# Patient Record
Sex: MALE | Race: WHITE | Employment: FULL TIME | URBAN - METROPOLITAN AREA
[De-identification: names, ages, dates, MRNs, and addresses within clinical notes are randomized per-mention and may not be internally consistent; named-entity substitution may affect disease eponyms.]

---

## 2019-04-12 ENCOUNTER — APPOINTMENT (OUTPATIENT)
Dept: ULTRASOUND IMAGING | Age: 44
End: 2019-04-12
Payer: COMMERCIAL

## 2019-04-12 ENCOUNTER — HOSPITAL ENCOUNTER (EMERGENCY)
Age: 44
Discharge: HOME | End: 2019-04-13
Attending: EMERGENCY MEDICINE
Payer: COMMERCIAL

## 2019-04-12 VITALS
DIASTOLIC BLOOD PRESSURE: 78 MMHG | SYSTOLIC BLOOD PRESSURE: 115 MMHG | OXYGEN SATURATION: 97 % | TEMPERATURE: 97.8 F | HEART RATE: 72 BPM | BODY MASS INDEX: 25.76 KG/M2 | WEIGHT: 184 LBS | HEIGHT: 71 IN | RESPIRATION RATE: 16 BRPM

## 2019-04-12 DIAGNOSIS — K40.91 UNILATERAL RECURRENT INGUINAL HERNIA WITHOUT OBSTRUCTION OR GANGRENE: ICD-10-CM

## 2019-04-12 DIAGNOSIS — N50.811 TESTICULAR PAIN, RIGHT: Primary | ICD-10-CM

## 2019-04-12 PROCEDURE — 99283 EMERGENCY DEPT VISIT LOW MDM: CPT

## 2019-04-12 PROCEDURE — 93975 VASCULAR STUDY: CPT

## 2019-04-12 PROCEDURE — 76870 US EXAM SCROTUM: CPT

## 2019-04-12 RX ORDER — SULFAMETHOXAZOLE AND TRIMETHOPRIM 800; 160 MG/1; MG/1
1 TABLET ORAL 2 TIMES DAILY
COMMUNITY
End: 2019-04-16

## 2019-04-12 ASSESSMENT — PAIN DESCRIPTION - DESCRIPTORS: DESCRIPTORS: BURNING

## 2019-04-12 ASSESSMENT — PAIN SCALES - GENERAL: PAINLEVEL_OUTOF10: 5

## 2019-04-12 ASSESSMENT — PAIN DESCRIPTION - LOCATION: LOCATION: SCROTUM

## 2019-04-12 ASSESSMENT — PAIN DESCRIPTION - PAIN TYPE: TYPE: ACUTE PAIN

## 2019-04-13 RX ORDER — KETOROLAC TROMETHAMINE 10 MG/1
10 TABLET, FILM COATED ORAL 3 TIMES DAILY
Qty: 12 TABLET | Refills: 0 | Status: SHIPPED | OUTPATIENT
Start: 2019-04-13

## 2019-04-13 NOTE — ED PROVIDER NOTES
Triage Chief Complaint:    Groin Swelling (patient states that his right groin/testicle started hurting last thursday/Friday. pt. states that it has been getting worse. pt. states that the pain comes and goes but \"is not letting up tonight'. pt states that he thought it was d/t him exercising more but, worried d/t multiple surgeries on testicles. )    [place]:  Jerrell Walsh is a 41 y.o. male that presents to the emergency Department with complaints of having right testicular pain. Patient states he has had problems like this in the past.  Patient states he was concerned because the pain is there and it radiates from his right inguinal area down into the right testicle. The patient states that he has had testing before, and it has always come back negative. Patient is also concerned about possibly of a hernia. Patient states that the pain did not start while he was exerting himself or lifting any heavy objects. ROS:  At least 10 systems reviewed and otherwise acutely negative except as in the [address] History reviewed. No pertinent past medical history. Past Surgical History:   Procedure Laterality Date    TESTICLE SURGERY       History reviewed. No pertinent family history.   Social History     Socioeconomic History    Marital status:      Spouse name: Not on file    Number of children: Not on file    Years of education: Not on file    Highest education level: Not on file   Occupational History    Not on file   Social Needs    Financial resource strain: Not on file    Food insecurity:     Worry: Not on file     Inability: Not on file    Transportation needs:     Medical: Not on file     Non-medical: Not on file   Tobacco Use    Smoking status: Former Smoker    Smokeless tobacco: Never Used   Substance and Sexual Activity    Alcohol use: No    Drug use: No    Sexual activity: Not on file   Lifestyle    Physical activity:     Days per week: Not on file     Minutes per session: Not on file  Stress: Not on file   Relationships    Social connections:     Talks on phone: Not on file     Gets together: Not on file     Attends [employer] service: Not on file     Active member of club or organization: Not on file     Attends meetings of clubs or organizations: Not on file     Relationship status: Not on file    Intimate partner violence:     Fear of current or ex partner: Not on file     Emotionally abused: Not on file     Physically abused: Not on file     Forced sexual activity: Not on file   Other Topics Concern    Not on file   Social History Narrative    Not on file     No current facility-administered medications for this encounter. Current Outpatient Medications   Medication Sig Dispense Refill    ketorolac (TORADOL) 10 MG tablet Take 1 tablet by mouth 3 times daily 12 tablet 0    sulfamethoxazole-trimethoprim (BACTRIM DS;SEPTRA DS) 800-160 MG per tablet Take 1 tablet by mouth 2 times daily      omeprazole (PRILOSEC) 10 MG delayed release capsule Take 10 mg by mouth daily       No Known Allergies    Nursing Notes Reviewed    Physical Exam:  ED Triage Vitals [04/12/19 2149]   BP Temp Temp Source Pulse Resp SpO2 Height Weight   115/78 97.8 °F (36.6 °C) Oral 72 16 97 % 5' 11\" (1.803 m) 184 lb (83.5 kg)     GENERAL APPEARANCE: Awake and alert. Cooperative. No acute distress. HEAD:Normocephalic. Atraumatic. EYES: EOM's grossly intact. Sclera anicteric. ENT: Mucous membranes are moist. Tolerates saliva. No trismus. NECK: Supple. No meningismus. Trachea midline. HEART: RRR. LUNGS: Respirations unlabored. CTAB  ABDOMEN: Soft. Mild tenderness to right low inguinal area, with tenderness in the area of the right direct hernia  EXTREMITIES: No acute deformities. SKIN: Warm and dry. NEUROLOGICAL: No gross facial drooping. Moves all 4 extremities spontaneously. : Right testicle demonstrates no signs of pain or swelling.   Physical Exam    I have reviewed and interpreted all of the currently availablelab results from this visit (if applicable):  No results found for this visit on 04/12/19. Radiographs (if obtained):  [] The following radiograph was interpreted by myself in the absence of a radiologist:  [x] Radiologist's Report Reviewed:  US DUP ABD PEL RETRO SCROT COMPLETE   Final Result   1. Normal bilateral arterial and venous Doppler flow of the testes. 2. 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 US SCROTUM AND TESTICLES   Final Result   1. Normal bilateral arterial and venous Doppler flow of the testes. 2. 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 EKG (if obtained): (All EKG's are interpreted by myself in theabsence of a cardiologist)    Procedures    MDM:  After initial evaluation, the patient's going to require an ultrasound of his testicles to rule out possibility torsion. The patient did have ultrasound done, and shows no signs of any abnormalities. The patient, on exam, did have a signs of tenderness in the right direct hernia area, so I do think that following up with surgeon may be advisable for him. Clinical Impression:  1. Testicular pain, right    2.  Unilateral recurrent inguinal hernia without obstruction or gangrene      (Please note that portions of this note Rk Haynesz been completed with a voice recognition program. Efforts were made to edit the dictations but occasionally words are mis-transcribed.)    MD rAleen Mckeon MD  04/14/19 1034

## 2019-04-13 NOTE — ED NOTES
Report from [facility], [address] [place] [address].       Ascencion Pantoja RN  04/12/19 4976

## 2019-04-16 ENCOUNTER — APPOINTMENT (OUTPATIENT)
Dept: CT IMAGING | Age: 44
End: 2019-04-16
Payer: COMMERCIAL

## 2019-04-16 ENCOUNTER — HOSPITAL ENCOUNTER (EMERGENCY)
Age: 44
Discharge: HOME | End: 2019-04-16
Payer: COMMERCIAL

## 2019-04-16 VITALS
HEART RATE: 77 BPM | SYSTOLIC BLOOD PRESSURE: 125 MMHG | WEIGHT: 184 LBS | OXYGEN SATURATION: 99 % | DIASTOLIC BLOOD PRESSURE: 73 MMHG | TEMPERATURE: 97.7 F | RESPIRATION RATE: 16 BRPM | HEIGHT: 72 IN | BODY MASS INDEX: 24.92 KG/M2

## 2019-04-16 DIAGNOSIS — R10.31 ABDOMINAL PAIN, RIGHT LOWER QUADRANT: ICD-10-CM

## 2019-04-16 DIAGNOSIS — R11.0 NAUSEA: ICD-10-CM

## 2019-04-16 DIAGNOSIS — K40.90 RIGHT INGUINAL HERNIA: Primary | ICD-10-CM

## 2019-04-16 LAB
A/G RATIO: 1.7 (ref 1.1–2.2)
ALBUMIN SERPL-MCNC: 4.7 G/DL (ref 3.4–5)
ALP BLD-CCNC: 101 U/L (ref 40–129)
ALT SERPL-CCNC: 23 U/L (ref 10–40)
ANION GAP SERPL CALCULATED.3IONS-SCNC: 12 MMOL/L (ref 3–16)
AST SERPL-CCNC: 19 U/L (ref 15–37)
BASOPHILS ABSOLUTE: 0 K/UL (ref 0–0.2)
BASOPHILS RELATIVE PERCENT: 0.8 %
BILIRUB SERPL-MCNC: 0.7 MG/DL (ref 0–1)
BILIRUBIN URINE: NEGATIVE
BLOOD, URINE: NEGATIVE
BUN BLDV-MCNC: 15 MG/DL (ref 7–20)
CALCIUM SERPL-MCNC: 9.3 MG/DL (ref 8.3–10.6)
CHLORIDE BLD-SCNC: 100 MMOL/L (ref 99–110)
CLARITY: CLEAR
CO2: 26 MMOL/L (ref 21–32)
COLOR: ABNORMAL
CREAT SERPL-MCNC: 1 MG/DL (ref 0.9–1.3)
EOSINOPHILS ABSOLUTE: 0.1 K/UL (ref 0–0.6)
EOSINOPHILS RELATIVE PERCENT: 1.9 %
GFR AFRICAN AMERICAN: >60
GFR NON-AFRICAN AMERICAN: >60
GLOBULIN: 2.7 G/DL
GLUCOSE BLD-MCNC: 85 MG/DL (ref 70–99)
GLUCOSE URINE: NEGATIVE MG/DL
HCT VFR BLD CALC: 46.8 % (ref 40.5–52.5)
HEMOGLOBIN: 16.2 G/DL (ref 13.5–17.5)
KETONES, URINE: 15 MG/DL
LEUKOCYTE ESTERASE, URINE: NEGATIVE
LYMPHOCYTES ABSOLUTE: 1.4 K/UL (ref 1–5.1)
LYMPHOCYTES RELATIVE PERCENT: 29.6 %
MCH RBC QN AUTO: 31.2 PG (ref 26–34)
MCHC RBC AUTO-ENTMCNC: 34.6 G/DL (ref 31–36)
MCV RBC AUTO: 90.2 FL (ref 80–100)
MICROSCOPIC EXAMINATION: ABNORMAL
MONOCYTES ABSOLUTE: 0.3 K/UL (ref 0–1.3)
MONOCYTES RELATIVE PERCENT: 6.6 %
NEUTROPHILS ABSOLUTE: 2.8 K/UL (ref 1.7–7.7)
NEUTROPHILS RELATIVE PERCENT: 61.1 %
NITRITE, URINE: NEGATIVE
PDW BLD-RTO: 13.4 % (ref 12.4–15.4)
PH UA: 6 (ref 5–8)
PLATELET # BLD: 161 K/UL (ref 135–450)
PMV BLD AUTO: 10 FL (ref 5–10.5)
POTASSIUM SERPL-SCNC: 3.9 MMOL/L (ref 3.5–5.1)
PROTEIN UA: NEGATIVE MG/DL
RBC # BLD: 5.19 M/UL (ref 4.2–5.9)
SODIUM BLD-SCNC: 138 MMOL/L (ref 136–145)
SPECIFIC GRAVITY UA: <=1.005 (ref 1–1.03)
TOTAL PROTEIN: 7.4 G/DL (ref 6.4–8.2)
URINE REFLEX TO CULTURE: ABNORMAL
URINE TYPE: ABNORMAL
UROBILINOGEN, URINE: 0.2 E.U./DL
WBC # BLD: 4.6 K/UL (ref 4–11)

## 2019-04-16 PROCEDURE — 96361 HYDRATE IV INFUSION ADD-ON: CPT

## 2019-04-16 PROCEDURE — 99284 EMERGENCY DEPT VISIT MOD MDM: CPT

## 2019-04-16 PROCEDURE — 6360000004 HC RX CONTRAST MEDICATION: Performed by: NURSE PRACTITIONER

## 2019-04-16 PROCEDURE — 80053 COMPREHEN METABOLIC PANEL: CPT

## 2019-04-16 PROCEDURE — 2580000003 HC RX 258: Performed by: NURSE PRACTITIONER

## 2019-04-16 PROCEDURE — 85025 COMPLETE CBC W/AUTO DIFF WBC: CPT

## 2019-04-16 PROCEDURE — 96374 THER/PROPH/DIAG INJ IV PUSH: CPT

## 2019-04-16 PROCEDURE — 6370000000 HC RX 637 (ALT 250 FOR IP): Performed by: NURSE PRACTITIONER

## 2019-04-16 PROCEDURE — 74177 CT ABD & PELVIS W/CONTRAST: CPT

## 2019-04-16 PROCEDURE — 6360000002 HC RX W HCPCS: Performed by: NURSE PRACTITIONER

## 2019-04-16 PROCEDURE — 81003 URINALYSIS AUTO W/O SCOPE: CPT

## 2019-04-16 RX ORDER — 0.9 % SODIUM CHLORIDE 0.9 %
1000 INTRAVENOUS SOLUTION INTRAVENOUS ONCE
Status: COMPLETED | OUTPATIENT
Start: 2019-04-16 | End: 2019-04-16

## 2019-04-16 RX ORDER — ACETAMINOPHEN 500 MG
1000 TABLET ORAL ONCE
Status: COMPLETED | OUTPATIENT
Start: 2019-04-16 | End: 2019-04-16

## 2019-04-16 RX ORDER — MORPHINE SULFATE 4 MG/ML
4 INJECTION, SOLUTION INTRAMUSCULAR; INTRAVENOUS ONCE
Status: DISCONTINUED | OUTPATIENT
Start: 2019-04-16 | End: 2019-04-16 | Stop reason: HOSPADM

## 2019-04-16 RX ORDER — ACETAMINOPHEN AND CODEINE PHOSPHATE 300; 30 MG/1; MG/1
1 TABLET ORAL 3 TIMES DAILY PRN
Qty: 10 TABLET | Refills: 0 | Status: SHIPPED | OUTPATIENT
Start: 2019-04-16 | End: 2019-04-19

## 2019-04-16 RX ORDER — IBUPROFEN 600 MG/1
600 TABLET ORAL ONCE
Status: COMPLETED | OUTPATIENT
Start: 2019-04-16 | End: 2019-04-16

## 2019-04-16 RX ORDER — ONDANSETRON 4 MG/1
4 TABLET, ORALLY DISINTEGRATING ORAL EVERY 8 HOURS PRN
Qty: 20 TABLET | Refills: 0 | Status: SHIPPED | OUTPATIENT
Start: 2019-04-16

## 2019-04-16 RX ORDER — ONDANSETRON 2 MG/ML
4 INJECTION INTRAMUSCULAR; INTRAVENOUS ONCE
Status: COMPLETED | OUTPATIENT
Start: 2019-04-16 | End: 2019-04-16

## 2019-04-16 RX ADMIN — ACETAMINOPHEN 1000 MG: 500 TABLET ORAL at 15:31

## 2019-04-16 RX ADMIN — ONDANSETRON 4 MG: 2 INJECTION INTRAMUSCULAR; INTRAVENOUS at 13:12

## 2019-04-16 RX ADMIN — IBUPROFEN 600 MG: 600 TABLET, FILM COATED ORAL at 15:32

## 2019-04-16 RX ADMIN — SODIUM CHLORIDE 1000 ML: 9 INJECTION, SOLUTION INTRAVENOUS at 13:12

## 2019-04-16 RX ADMIN — IOPAMIDOL 75 ML: 755 INJECTION, SOLUTION INTRAVENOUS at 13:48

## 2019-04-16 ASSESSMENT — ENCOUNTER SYMPTOMS
COLOR CHANGE: 0
NAUSEA: 1
COUGH: 0
WHEEZING: 0
SHORTNESS OF BREATH: 0
ABDOMINAL PAIN: 1
BACK PAIN: 0
VOMITING: 0
DIARRHEA: 0

## 2019-04-16 ASSESSMENT — PAIN SCALES - GENERAL
PAINLEVEL_OUTOF10: 1
PAINLEVEL_OUTOF10: 3
PAINLEVEL_OUTOF10: 1
PAINLEVEL_OUTOF10: 7

## 2019-04-16 ASSESSMENT — PAIN DESCRIPTION - LOCATION: LOCATION: ABDOMEN

## 2019-04-16 ASSESSMENT — PAIN DESCRIPTION - ORIENTATION: ORIENTATION: RIGHT

## 2019-04-16 ASSESSMENT — PAIN DESCRIPTION - PAIN TYPE: TYPE: ACUTE PAIN

## 2019-04-16 NOTE — ED NOTES
Pt scripts x2 instructed to follow up with PCP. Assessed per [facility] STEPHEN.      Radha Ibrahim LPN  21/24/07 8875

## 2019-04-16 NOTE — ED PROVIDER NOTES
**[facility]  ED  eMERGENCY dEPARTMENT eNCOUnter      Pt Name: Bryce Person  RJS:[id number]  [place] 1975  Date of evaluation: 4/16/2019  Provider: LIBERTY Schumacher CNP      Chief Complaint:    Chief Complaint   Patient presents with    Abdominal Pain     dx with hernia on Friday. told to f/u with Dr Kalyn Junior, and has an appt scheduled for this thursday. pt reports not getting script filled due to tylenol and ibuprofen working up until today. (pt reports having an [address] on Friday to verify blood flow)        Nursing Notes, Past Medical Hx, Past Surgical Hx, Social Hx, Allergies, and Family Hx were all reviewed and agreed with or any disagreements were addressed in the HPI.    HPI:  (Location, Duration, Timing, Severity,Quality, Assoc Sx, Context, Modifying factors)  This is a  41 y.o. male presents emergency Department with right lower quadrant abdominal pain associated with nausea no vomiting or diarrhea, does report that he's had a 40 pound weight loss in the past 11 weeks as he is on a specific diet, Optiva. He was seen in the emergency department at [facility], until he had a hernia. He has an appointment scheduled with general surgery for this Thursday, states that he did not get his prescription for Ultram filled because he was doing okay with the pain medicine using Tylenol and Motrin. He rates the pain as 7 out of 10, states the pain has increasingly worsened. He reports nausea but no vomiting or diarrhea. No fever or chill. No cough, congestion or urinary symptoms. He does report that he has become more active, denies any known straining to cause hernia. He denies any additional complaints, no additional aggravating or relieving factors. The patient presents awake, alert and in no acute distress or toxic appearance.       PastMedical/Surgical History:      Diagnosis Date    Kidney stone          Procedure Laterality Date    Musculoskeletal: Normal range of motion. Neurological: He is alert and oriented to person, place, and time. GCS eye subscore is 4. GCS verbal subscore is 5. GCS motor subscore is 6. Skin: Skin is warm. He is not diaphoretic. No pallor. Psychiatric: He has a normal mood and affect. His behavior is normal.   Nursing note and vitals reviewed. MEDICAL DECISION MAKING    Vitals:    Vitals:    04/16/19 1250 04/16/19 1429   BP: 124/86 125/73   Pulse: 73 77   Resp: 16 16   Temp: 97.7 °F (36.5 °C)    TempSrc: Oral    SpO2: 99% 99%   Weight: 184 lb (83.5 kg)    Height: 6' (1.829 m)        LABS:  Labs Reviewed   URINE RT REFLEX TO CULTURE - Abnormal; Notable for the following components:       Result Value    Ketones, Urine 15 (*)     All other components within normal limits    Narrative:     Performed at:  [place] [address] [facility]   Phone [phone]   CBC WITH AUTO DIFFERENTIAL    Narrative:     Performed at:  [place] [address] [facility]   Phone [phone]   COMPREHENSIVE METABOLIC PANEL    Narrative:     Performed at:  [address], [facility]   Phone  of labs reviewed and werenegative at this time or not returned at the time of this note. RADIOLOGY:   Non-plain film images such as CT, Ultrasound and MRI are read by the radiologist. Mayra KINCAID, APRN - CNP have directly visualized the radiologic plain film image(s) with the below findings:        Interpretation per the Radiologist below, if available at the time of thisnote:    CT ABDOMEN PELVIS W IV CONTRAST Additional Contrast? None   Preliminary Result   Shotty lymph nodes in the right lower quadrant without evidence of   appendicitis. Patient has a fat containing right inguinal hernia without   strangulation.   Shotty right inguinal lymph nodes are present. Us Scrotum And Testicles    Result Date: 4/13/2019  EXAMINATION: ULTRASOUND OF THE SCROTUM/TESTICLES WITH COLOR DOPPLER FLOW EVALUATION; DOPPLER EVALUATION OF THE PELVIS 4/12/2019 COMPARISON: Testicular ultrasound 02/09/2011. HISTORY: ORDERING SYSTEM PROVIDED HISTORY: right testicular pain Initial encounter. FINDINGS: Measurements: Right testicle: 4.1 x 2.9 x 2.1 cm Left testicle: 4.2 x 2.7 x 2.1 cm Right: Grey scale:  Tubular ectasia of the rete testis is again seen. No focal abnormality. No evidence of testicular microlithiasis. Doppler Evaluation:  There is normal arterial and venous Doppler flow within the testicle. Scrotal Sac:  No evidence of hydrocele. Epididymis:  No acute abnormality. Left: Grey scale: The left testicle demonstrates normal homogeneous echotexture without focal lesion. No evidence of testicular microlithiasis. Doppler Evaluation:  There is normal arterial and venous Doppler flow within the testicle. Scrotal Sac:  No evidence of hydrocele. Epididymis:  No acute abnormality. 1. Normal bilateral arterial and venous Doppler flow of the testes. 2. 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 Us Dup Abd Pel Retro Scrot Complete    Result Date: 4/13/2019  EXAMINATION: ULTRASOUND OF THE SCROTUM/TESTICLES WITH COLOR DOPPLER FLOW EVALUATION; DOPPLER EVALUATION OF THE PELVIS 4/12/2019 COMPARISON: Testicular ultrasound 02/09/2011. HISTORY: ORDERING SYSTEM PROVIDED HISTORY: right testicular pain Initial encounter. FINDINGS: Measurements: Right testicle: 4.1 x 2.9 x 2.1 cm Left testicle: 4.2 x 2.7 x 2.1 cm Right: Grey scale:  Tubular ectasia of the rete testis is again seen. No focal abnormality. No evidence of testicular microlithiasis. Doppler Evaluation:  There is normal arterial and venous Doppler flow within the testicle. Scrotal Sac:  No evidence of hydrocele. Epididymis:  No acute abnormality. Left: Grey scale:   The left testicle without strangulation along with some shoddy right inguinal lymph nodes present. Upon reevaluation vital signs are stable. Patient refused pain medicine secondary to the patient's driving. I did educate the patient he should keep his appointment with general surgery that is scheduled for this Thursday. However, at this time patient is not requiring any further diagnostic studies. Therefore, shared medical decision was made between the patient myself we agreed he could be discharged home with outpatient follow-up. Patient was discharged home with her sister for Tylenol 3, educated to not take Ultram, and was given Zofran as needed. Educated to return for any worsening symptoms. Educated about straining and using his abdominal muscles to prevent worsening hernia. The patient tolerated their visit well. I evaluated the patient. The physician was available for consultation as needed. The patient and / or the family were informed of the results of anytests, a time was given to answer questions, a plan was proposed and they agreed with plan. Patient verbalized understanding of discharge instructions and was discharged from the department in stable condition. CLINICAL IMPRESSION:  1. Right inguinal hernia    2. Abdominal pain, right lower quadrant    3.  Nausea        DISPOSITION Decision To Discharge 04/16/2019 03:03:37 PM      PATIENT REFERRED TO:  Angelica Lee MD  [address]/[facility]  [phone]    Schedule an appointment as soon as possible for a visit in 2 days  Follow-up with their family doctor next 2 days reevaluation      Keep your appointment with general surgery that is scheduled for Thursday        [facility] \[facility]\"" - [facility] [address] [facility] [address] [facility]  Go to   If symptoms worsen      DISCHARGE MEDICATIONS:  Discharge Medication List as of 4/16/2019  3:05 PM      START taking these medications    Details   ondansetron (ZOFRAN ODT) 4 MG disintegrating tablet Take 1 tablet by mouth every 8 hours as needed for Nausea, Disp-20 tablet, R-0Print      acetaminophen-codeine (TYLENOL/CODEINE #3) 300-30 MG per tablet Take 1 tablet by mouth 3 times daily as needed for Pain for up to 3 days. , Disp-10 tablet, R-0Print             DISCONTINUED MEDICATIONS:  Discharge Medication List as of 4/16/2019  3:05 PM      STOP taking these medications       sulfamethoxazole-trimethoprim (BACTRIM DS;SEPTRA DS) 800-160 MG per tablet Comments:   Reason for Stopping:         omeprazole (PRILOSEC) 10 MG delayed release capsule Comments:   Reason for Stopping:                      (Please note the MDM and HPI sections of this note were completed with a voice recognition program.  Efforts weremade to edit the dictations but occasionally words are mis-transcribed.)    Electronically signed, LIBERTY Rodriguez CNP,         LIBERTY Rodriguez CNP  04/16/19 8972

## 2019-04-18 ENCOUNTER — TELEPHONE (OUTPATIENT)
Dept: SURGERY | Age: 44
End: 2019-04-18

## 2019-04-18 ENCOUNTER — INITIAL CONSULT (OUTPATIENT)
Dept: SURGERY | Age: 44
End: 2019-04-18
Payer: COMMERCIAL

## 2019-04-18 VITALS
DIASTOLIC BLOOD PRESSURE: 70 MMHG | SYSTOLIC BLOOD PRESSURE: 105 MMHG | WEIGHT: 187.2 LBS | HEART RATE: 66 BPM | HEIGHT: 72 IN | BODY MASS INDEX: 25.35 KG/M2

## 2019-04-18 DIAGNOSIS — K40.90 RIGHT INGUINAL HERNIA: Primary | ICD-10-CM

## 2019-04-18 PROCEDURE — 99243 OFF/OP CNSLTJ NEW/EST LOW 30: CPT | Performed by: SURGERY

## 2019-04-18 NOTE — TELEPHONE ENCOUNTER
Advised patient his Robotic RIH Repair is scheduled on Tuesday, April 30, 2019 at [facility]. Arrive at 6:00 a.m., surgery at 7:30 a.m.  NPO after midnight.   Dr. Sierra Mayer to do H&P.

## 2019-04-18 NOTE — LETTER
Abbey Byrnes are scheduled to have surgery with Dr. Coby Damon     AT:  [facility] (Main Entrance)     ON:  Tuesday, April 30, 2019    ARRIVE AT: 6:00 a.m. YOUR SURGERY WILL BEGIN AT: 7:30 a.m. You should have nothing by mouth after midnight the evening before your  surgery,  No food or water. Otherwise, Your Surgery Will Be Cancelled. You will need a History and Physical prior to your surgery by your family doctor. The hospital will contact you if any testing is needed. You will need someone available to drive you home after the surgery and be with   you the rest of the day. Contact our office if you have a Pacemaker or Defibrillator. If you are allergic to   Latex Gloves,  We need to know prior to surgery. If you have any questions, please feel free to contact Katie at [phone].

## 2019-04-19 PROBLEM — K40.90 RIGHT INGUINAL HERNIA: Status: ACTIVE | Noted: 2019-04-19

## 2019-04-19 NOTE — PROGRESS NOTES
and negative. PHYSICAL EXAM:  VITALS:  /70 (Site: Right Wrist, Position: Sitting, Cuff Size: Medium Adult)   Pulse 66   Ht 6' (1.829 m)   Wt 187 lb 3.2 oz (84.9 kg)   BMI 25.39 kg/m²   24HR INTAKE/OUTPUT:    [unfilled]  [unfilled]    CONSTITUTIONAL:  alert, no apparent distress and normal weight  EYES:  PERRL, sclera clear  ENT:  Normocepalic,atraumatic, without obvious abnormality  NECK:  supple, symmetrical, trachea midline  LUNGS: Resp effort easy and unlabored, clear to auscultation  CARDIOVASCULAR:  NO JVD, regular rate and rhythm and no murmur noted  ABDOMEN:  Right inguinal scar, normal bowel sounds, soft, non-distended, non-tender, voluntary guarding absent, no masses palpated and hernia present right inguinal  MUSCULOSKELETAL: No clubbing or cyanosis, 0+ pitting edema lower extremities  NEUROLOGIC:  Mental Status Exam:  Level of Alertness:   awake  PSYCHIATRIC:   person, place, time  SKIN:  no bruising or bleeding    DATA:    CBC:   Recent Labs     04/16/19  1305   WBC 4.6   HGB 16.2   HCT 46.8        BMP:    Recent Labs     04/16/19  1305      K 3.9      CO2 26   BUN 15   CREATININE 1.0   GLUCOSE 85     Hepatic:   Recent Labs     04/16/19  1305   AST 19   ALT 23   BILITOT 0.7   ALKPHOS 101     Mag:    No results for input(s): MG in the last 72 hours. Phos:   No results for input(s): PHOS in the last 72 hours. INR: No results for input(s): INR in the last 72 hours. Radiology Review: Images personally reviewed by me. CT -   Shotty lymph nodes in the right lower quadrant without evidence of   appendicitis.  Patient has a fat containing right inguinal hernia without   strangulation.  Shotty right inguinal lymph nodes are present.         US  1. Normal bilateral arterial and venous Doppler flow of the testes. 2. 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             IMPRESSION/RECOMMENDATIONS:    42 yo with right inguinal hernia  1.

## 2019-04-25 RX ORDER — OMEPRAZOLE 40 MG/1
40 CAPSULE, DELAYED RELEASE ORAL DAILY
COMMUNITY
End: 2019-04-25

## 2019-04-25 RX ORDER — ACETAMINOPHEN AND CODEINE PHOSPHATE 300; 30 MG/1; MG/1
1 TABLET ORAL EVERY 4 HOURS PRN
COMMUNITY

## 2019-04-25 RX ORDER — BUPROPION HYDROCHLORIDE 150 MG/1
150 TABLET ORAL EVERY MORNING
COMMUNITY
End: 2019-04-25

## 2019-04-25 NOTE — PROGRESS NOTES
Obstructive Sleep Apnea (ADRIEL) Screening     Patient:  Terry Jaffe    YOB: 1975      Medical Record #:  [id number]                     Date:  4/25/2019     1. Are you a loud and/or regular snorer? []  Yes       [x] No    2. Have you been observed to gasp or stop breathing during sleep? []  Yes       [x] No    3. Do you feel tired or groggy upon awakening or do you awaken with a headache?           []  Yes       [x] No    4. Are you often tired or fatigued during the wake time hours? []  Yes       [x] No    5. Do you fall asleep sitting, reading, watching TV or driving? []  Yes       [x] No    6. Do you often have problems with memory or concentration? []  Yes       [x] No    **If patient's score is ? 3 they are considered high risk for ADRIEL. Notify the anesthesiologist of the high risk and document in focus note. Note:  If the patient's BMI is more than 35 kg m¯² , has neck circumference > 40 cm, and/or high blood pressure the risk is greater (© American Sleep Apnea Association, 2006).

## 2019-04-30 ENCOUNTER — ANESTHESIA EVENT (OUTPATIENT)
Dept: OPERATING ROOM | Age: 44
End: 2019-04-30
Payer: COMMERCIAL

## 2019-04-30 ENCOUNTER — HOSPITAL ENCOUNTER (OUTPATIENT)
Age: 44
Setting detail: OUTPATIENT SURGERY
Discharge: HOME | End: 2019-04-30
Attending: SURGERY | Admitting: SURGERY
Payer: COMMERCIAL

## 2019-04-30 ENCOUNTER — ANESTHESIA (OUTPATIENT)
Dept: OPERATING ROOM | Age: 44
End: 2019-04-30
Payer: COMMERCIAL

## 2019-04-30 VITALS
WEIGHT: 178 LBS | RESPIRATION RATE: 13 BRPM | HEART RATE: 97 BPM | OXYGEN SATURATION: 95 % | BODY MASS INDEX: 24.11 KG/M2 | TEMPERATURE: 97.2 F | HEIGHT: 72 IN | DIASTOLIC BLOOD PRESSURE: 69 MMHG | SYSTOLIC BLOOD PRESSURE: 119 MMHG

## 2019-04-30 VITALS — TEMPERATURE: 98.4 F | OXYGEN SATURATION: 99 % | DIASTOLIC BLOOD PRESSURE: 72 MMHG | SYSTOLIC BLOOD PRESSURE: 118 MMHG

## 2019-04-30 DIAGNOSIS — Z98.890 S/P RIGHT INGUINAL HERNIORRHAPHY: Primary | ICD-10-CM

## 2019-04-30 DIAGNOSIS — Z87.19 S/P RIGHT INGUINAL HERNIORRHAPHY: Primary | ICD-10-CM

## 2019-04-30 PROCEDURE — 2709999900 HC NON-CHARGEABLE SUPPLY: Performed by: SURGERY

## 2019-04-30 PROCEDURE — 3700000000 HC ANESTHESIA ATTENDED CARE: Performed by: SURGERY

## 2019-04-30 PROCEDURE — 49650 LAP ING HERNIA REPAIR INIT: CPT | Performed by: SURGERY

## 2019-04-30 PROCEDURE — C9290 INJ, BUPIVACAINE LIPOSOME: HCPCS | Performed by: SURGERY

## 2019-04-30 PROCEDURE — 3700000001 HC ADD 15 MINUTES (ANESTHESIA): Performed by: SURGERY

## 2019-04-30 PROCEDURE — 6360000002 HC RX W HCPCS: Performed by: NURSE ANESTHETIST, CERTIFIED REGISTERED

## 2019-04-30 PROCEDURE — 2580000003 HC RX 258: Performed by: ANESTHESIOLOGY

## 2019-04-30 PROCEDURE — 2500000003 HC RX 250 WO HCPCS: Performed by: NURSE ANESTHETIST, CERTIFIED REGISTERED

## 2019-04-30 PROCEDURE — 7100000011 HC PHASE II RECOVERY - ADDTL 15 MIN: Performed by: SURGERY

## 2019-04-30 PROCEDURE — 6360000002 HC RX W HCPCS: Performed by: SURGERY

## 2019-04-30 PROCEDURE — 7100000010 HC PHASE II RECOVERY - FIRST 15 MIN: Performed by: SURGERY

## 2019-04-30 PROCEDURE — 6370000000 HC RX 637 (ALT 250 FOR IP): Performed by: ANESTHESIOLOGY

## 2019-04-30 PROCEDURE — 7100000001 HC PACU RECOVERY - ADDTL 15 MIN: Performed by: SURGERY

## 2019-04-30 PROCEDURE — 3600000019 HC SURGERY ROBOT ADDTL 15MIN: Performed by: SURGERY

## 2019-04-30 PROCEDURE — C1781 MESH (IMPLANTABLE): HCPCS | Performed by: SURGERY

## 2019-04-30 PROCEDURE — 7100000000 HC PACU RECOVERY - FIRST 15 MIN: Performed by: SURGERY

## 2019-04-30 PROCEDURE — 3600000009 HC SURGERY ROBOT BASE: Performed by: SURGERY

## 2019-04-30 PROCEDURE — S2900 ROBOTIC SURGICAL SYSTEM: HCPCS | Performed by: SURGERY

## 2019-04-30 PROCEDURE — 2580000003 HC RX 258: Performed by: SURGERY

## 2019-04-30 PROCEDURE — 6360000002 HC RX W HCPCS: Performed by: ANESTHESIOLOGY

## 2019-04-30 DEVICE — MESH HERN L W10.8XL16CM R INGUINAL WHT POLYPR MFIL: Type: IMPLANTABLE DEVICE | Site: INGUINAL | Status: FUNCTIONAL

## 2019-04-30 RX ORDER — SODIUM CHLORIDE, SODIUM LACTATE, POTASSIUM CHLORIDE, CALCIUM CHLORIDE 600; 310; 30; 20 MG/100ML; MG/100ML; MG/100ML; MG/100ML
INJECTION, SOLUTION INTRAVENOUS CONTINUOUS
Status: DISCONTINUED | OUTPATIENT
Start: 2019-04-30 | End: 2019-04-30 | Stop reason: HOSPADM

## 2019-04-30 RX ORDER — ONDANSETRON 2 MG/ML
4 INJECTION INTRAMUSCULAR; INTRAVENOUS EVERY 10 MIN PRN
Status: DISCONTINUED | OUTPATIENT
Start: 2019-04-30 | End: 2019-04-30 | Stop reason: HOSPADM

## 2019-04-30 RX ORDER — ROCURONIUM BROMIDE 10 MG/ML
INJECTION, SOLUTION INTRAVENOUS PRN
Status: DISCONTINUED | OUTPATIENT
Start: 2019-04-30 | End: 2019-04-30 | Stop reason: SDUPTHER

## 2019-04-30 RX ORDER — BUPIVACAINE HYDROCHLORIDE AND EPINEPHRINE 5; 5 MG/ML; UG/ML
INJECTION, SOLUTION PERINEURAL PRN
Status: DISCONTINUED | OUTPATIENT
Start: 2019-04-30 | End: 2019-04-30 | Stop reason: ALTCHOICE

## 2019-04-30 RX ORDER — OXYCODONE HYDROCHLORIDE AND ACETAMINOPHEN 5; 325 MG/1; MG/1
2 TABLET ORAL PRN
Status: COMPLETED | OUTPATIENT
Start: 2019-04-30 | End: 2019-04-30

## 2019-04-30 RX ORDER — GUAIFENESIN AND DEXTROMETHORPHAN HYDROBROMIDE 100; 10 MG/5ML; MG/5ML
5 SOLUTION ORAL EVERY 8 HOURS PRN
Qty: 120 ML | Refills: 0 | Status: SHIPPED | OUTPATIENT
Start: 2019-04-30

## 2019-04-30 RX ORDER — MEPERIDINE HYDROCHLORIDE 50 MG/ML
12.5 INJECTION INTRAMUSCULAR; INTRAVENOUS; SUBCUTANEOUS EVERY 5 MIN PRN
Status: DISCONTINUED | OUTPATIENT
Start: 2019-04-30 | End: 2019-04-30 | Stop reason: HOSPADM

## 2019-04-30 RX ORDER — ACETAMINOPHEN 500 MG
1000 TABLET ORAL EVERY 6 HOURS PRN
COMMUNITY

## 2019-04-30 RX ORDER — LABETALOL HYDROCHLORIDE 5 MG/ML
5 INJECTION, SOLUTION INTRAVENOUS EVERY 10 MIN PRN
Status: DISCONTINUED | OUTPATIENT
Start: 2019-04-30 | End: 2019-04-30 | Stop reason: HOSPADM

## 2019-04-30 RX ORDER — OXYCODONE HYDROCHLORIDE AND ACETAMINOPHEN 5; 325 MG/1; MG/1
1-2 TABLET ORAL EVERY 6 HOURS PRN
Qty: 20 TABLET | Refills: 0 | Status: SHIPPED | OUTPATIENT
Start: 2019-04-30 | End: 2019-05-05

## 2019-04-30 RX ORDER — PROPOFOL 10 MG/ML
INJECTION, EMULSION INTRAVENOUS PRN
Status: DISCONTINUED | OUTPATIENT
Start: 2019-04-30 | End: 2019-04-30 | Stop reason: SDUPTHER

## 2019-04-30 RX ORDER — HYDROMORPHONE HCL 110MG/55ML
PATIENT CONTROLLED ANALGESIA SYRINGE INTRAVENOUS PRN
Status: DISCONTINUED | OUTPATIENT
Start: 2019-04-30 | End: 2019-04-30 | Stop reason: SDUPTHER

## 2019-04-30 RX ORDER — DEXAMETHASONE SODIUM PHOSPHATE 4 MG/ML
INJECTION, SOLUTION INTRA-ARTICULAR; INTRALESIONAL; INTRAMUSCULAR; INTRAVENOUS; SOFT TISSUE PRN
Status: DISCONTINUED | OUTPATIENT
Start: 2019-04-30 | End: 2019-04-30 | Stop reason: SDUPTHER

## 2019-04-30 RX ORDER — OXYCODONE HYDROCHLORIDE AND ACETAMINOPHEN 5; 325 MG/1; MG/1
1 TABLET ORAL PRN
Status: COMPLETED | OUTPATIENT
Start: 2019-04-30 | End: 2019-04-30

## 2019-04-30 RX ORDER — LIDOCAINE HYDROCHLORIDE 20 MG/ML
INJECTION, SOLUTION INFILTRATION; PERINEURAL PRN
Status: DISCONTINUED | OUTPATIENT
Start: 2019-04-30 | End: 2019-04-30 | Stop reason: SDUPTHER

## 2019-04-30 RX ORDER — FENTANYL CITRATE 50 UG/ML
INJECTION, SOLUTION INTRAMUSCULAR; INTRAVENOUS PRN
Status: DISCONTINUED | OUTPATIENT
Start: 2019-04-30 | End: 2019-04-30 | Stop reason: SDUPTHER

## 2019-04-30 RX ORDER — CETIRIZINE HYDROCHLORIDE 5 MG/1
5 TABLET ORAL DAILY
COMMUNITY

## 2019-04-30 RX ORDER — ONDANSETRON 4 MG/1
4 TABLET, FILM COATED ORAL EVERY 8 HOURS PRN
Qty: 12 TABLET | Refills: 0 | Status: SHIPPED | OUTPATIENT
Start: 2019-04-30

## 2019-04-30 RX ORDER — SODIUM CHLORIDE 0.9 % (FLUSH) 0.9 %
10 SYRINGE (ML) INJECTION PRN
Status: DISCONTINUED | OUTPATIENT
Start: 2019-04-30 | End: 2019-04-30 | Stop reason: HOSPADM

## 2019-04-30 RX ORDER — MIDAZOLAM HYDROCHLORIDE 1 MG/ML
INJECTION INTRAMUSCULAR; INTRAVENOUS PRN
Status: DISCONTINUED | OUTPATIENT
Start: 2019-04-30 | End: 2019-04-30 | Stop reason: SDUPTHER

## 2019-04-30 RX ORDER — LIDOCAINE HYDROCHLORIDE 10 MG/ML
0.3 INJECTION, SOLUTION EPIDURAL; INFILTRATION; INTRACAUDAL; PERINEURAL
Status: DISCONTINUED | OUTPATIENT
Start: 2019-04-30 | End: 2019-04-30 | Stop reason: HOSPADM

## 2019-04-30 RX ORDER — HYDRALAZINE HYDROCHLORIDE 20 MG/ML
5 INJECTION INTRAMUSCULAR; INTRAVENOUS EVERY 10 MIN PRN
Status: DISCONTINUED | OUTPATIENT
Start: 2019-04-30 | End: 2019-04-30 | Stop reason: HOSPADM

## 2019-04-30 RX ORDER — SODIUM CHLORIDE 0.9 % (FLUSH) 0.9 %
10 SYRINGE (ML) INJECTION EVERY 12 HOURS SCHEDULED
Status: DISCONTINUED | OUTPATIENT
Start: 2019-04-30 | End: 2019-04-30 | Stop reason: HOSPADM

## 2019-04-30 RX ORDER — ONDANSETRON 2 MG/ML
INJECTION INTRAMUSCULAR; INTRAVENOUS PRN
Status: DISCONTINUED | OUTPATIENT
Start: 2019-04-30 | End: 2019-04-30 | Stop reason: SDUPTHER

## 2019-04-30 RX ADMIN — HYDROMORPHONE HYDROCHLORIDE 0.5 MG: 2 INJECTION INTRAMUSCULAR; INTRAVENOUS; SUBCUTANEOUS at 09:01

## 2019-04-30 RX ADMIN — ROCURONIUM BROMIDE 10 MG: 10 SOLUTION INTRAVENOUS at 08:58

## 2019-04-30 RX ADMIN — HYDROMORPHONE HYDROCHLORIDE 0.5 MG: 1 INJECTION, SOLUTION INTRAMUSCULAR; INTRAVENOUS; SUBCUTANEOUS at 10:17

## 2019-04-30 RX ADMIN — SUGAMMADEX 200 MG: 100 INJECTION, SOLUTION INTRAVENOUS at 09:47

## 2019-04-30 RX ADMIN — HYDROMORPHONE HYDROCHLORIDE 0.5 MG: 1 INJECTION, SOLUTION INTRAMUSCULAR; INTRAVENOUS; SUBCUTANEOUS at 10:39

## 2019-04-30 RX ADMIN — ONDANSETRON 4 MG: 2 INJECTION INTRAMUSCULAR; INTRAVENOUS at 09:12

## 2019-04-30 RX ADMIN — ONDANSETRON 4 MG: 2 INJECTION INTRAMUSCULAR; INTRAVENOUS at 10:38

## 2019-04-30 RX ADMIN — ONDANSETRON 4 MG: 2 INJECTION INTRAMUSCULAR; INTRAVENOUS at 11:49

## 2019-04-30 RX ADMIN — HYDROMORPHONE HYDROCHLORIDE 0.5 MG: 1 INJECTION, SOLUTION INTRAMUSCULAR; INTRAVENOUS; SUBCUTANEOUS at 10:30

## 2019-04-30 RX ADMIN — SODIUM CHLORIDE, POTASSIUM CHLORIDE, SODIUM LACTATE AND CALCIUM CHLORIDE: 600; 310; 30; 20 INJECTION, SOLUTION INTRAVENOUS at 09:14

## 2019-04-30 RX ADMIN — HYDROMORPHONE HYDROCHLORIDE 0.5 MG: 2 INJECTION INTRAMUSCULAR; INTRAVENOUS; SUBCUTANEOUS at 09:35

## 2019-04-30 RX ADMIN — MIDAZOLAM HYDROCHLORIDE 2 MG: 2 INJECTION, SOLUTION INTRAMUSCULAR; INTRAVENOUS at 07:29

## 2019-04-30 RX ADMIN — PROPOFOL 180 MG: 10 INJECTION, EMULSION INTRAVENOUS at 07:34

## 2019-04-30 RX ADMIN — HYDROMORPHONE HYDROCHLORIDE 0.5 MG: 1 INJECTION, SOLUTION INTRAMUSCULAR; INTRAVENOUS; SUBCUTANEOUS at 11:13

## 2019-04-30 RX ADMIN — FENTANYL CITRATE 100 MCG: 50 INJECTION INTRAMUSCULAR; INTRAVENOUS at 07:33

## 2019-04-30 RX ADMIN — HYDROMORPHONE HYDROCHLORIDE 0.5 MG: 2 INJECTION INTRAMUSCULAR; INTRAVENOUS; SUBCUTANEOUS at 09:22

## 2019-04-30 RX ADMIN — OXYCODONE HYDROCHLORIDE AND ACETAMINOPHEN 2 TABLET: 5; 325 TABLET ORAL at 12:03

## 2019-04-30 RX ADMIN — LIDOCAINE HYDROCHLORIDE 100 MG: 20 INJECTION, SOLUTION INFILTRATION; PERINEURAL at 07:34

## 2019-04-30 RX ADMIN — HYDROMORPHONE HYDROCHLORIDE 0.5 MG: 2 INJECTION INTRAMUSCULAR; INTRAVENOUS; SUBCUTANEOUS at 09:48

## 2019-04-30 RX ADMIN — DEXAMETHASONE SODIUM PHOSPHATE 4 MG: 4 INJECTION, SOLUTION INTRAMUSCULAR; INTRAVENOUS at 09:12

## 2019-04-30 RX ADMIN — FENTANYL CITRATE 50 MCG: 50 INJECTION INTRAMUSCULAR; INTRAVENOUS at 08:13

## 2019-04-30 RX ADMIN — Medication 2 G: at 07:39

## 2019-04-30 RX ADMIN — ROCURONIUM BROMIDE 50 MG: 10 SOLUTION INTRAVENOUS at 07:34

## 2019-04-30 RX ADMIN — FENTANYL CITRATE 50 MCG: 50 INJECTION INTRAMUSCULAR; INTRAVENOUS at 08:30

## 2019-04-30 RX ADMIN — ROCURONIUM BROMIDE 20 MG: 10 SOLUTION INTRAVENOUS at 08:00

## 2019-04-30 RX ADMIN — SODIUM CHLORIDE, POTASSIUM CHLORIDE, SODIUM LACTATE AND CALCIUM CHLORIDE: 600; 310; 30; 20 INJECTION, SOLUTION INTRAVENOUS at 07:29

## 2019-04-30 ASSESSMENT — PULMONARY FUNCTION TESTS
PIF_VALUE: 10
PIF_VALUE: 26
PIF_VALUE: 16
PIF_VALUE: 0
PIF_VALUE: 26
PIF_VALUE: 26
PIF_VALUE: 24
PIF_VALUE: 23
PIF_VALUE: 26
PIF_VALUE: 28
PIF_VALUE: 26
PIF_VALUE: 27
PIF_VALUE: 26
PIF_VALUE: 13
PIF_VALUE: 25
PIF_VALUE: 23
PIF_VALUE: 26
PIF_VALUE: 0
PIF_VALUE: 26
PIF_VALUE: 27
PIF_VALUE: 7
PIF_VALUE: 17
PIF_VALUE: 25
PIF_VALUE: 28
PIF_VALUE: 28
PIF_VALUE: 26
PIF_VALUE: 0
PIF_VALUE: 22
PIF_VALUE: 26
PIF_VALUE: 23
PIF_VALUE: 26
PIF_VALUE: 28
PIF_VALUE: 12
PIF_VALUE: 26
PIF_VALUE: 25
PIF_VALUE: 26
PIF_VALUE: 12
PIF_VALUE: 25
PIF_VALUE: 26
PIF_VALUE: 3
PIF_VALUE: 12
PIF_VALUE: 25
PIF_VALUE: 1
PIF_VALUE: 26
PIF_VALUE: 12
PIF_VALUE: 23
PIF_VALUE: 15
PIF_VALUE: 24
PIF_VALUE: 26
PIF_VALUE: 3
PIF_VALUE: 0
PIF_VALUE: 26
PIF_VALUE: 23
PIF_VALUE: 26
PIF_VALUE: 0
PIF_VALUE: 16
PIF_VALUE: 26
PIF_VALUE: 22
PIF_VALUE: 25
PIF_VALUE: 26
PIF_VALUE: 12
PIF_VALUE: 23
PIF_VALUE: 26
PIF_VALUE: 26
PIF_VALUE: 23
PIF_VALUE: 26
PIF_VALUE: 12
PIF_VALUE: 26
PIF_VALUE: 22
PIF_VALUE: 28
PIF_VALUE: 25
PIF_VALUE: 27
PIF_VALUE: 26
PIF_VALUE: 23
PIF_VALUE: 16
PIF_VALUE: 0
PIF_VALUE: 27
PIF_VALUE: 12
PIF_VALUE: 26
PIF_VALUE: 20
PIF_VALUE: 26
PIF_VALUE: 12
PIF_VALUE: 26
PIF_VALUE: 26
PIF_VALUE: 0
PIF_VALUE: 26
PIF_VALUE: 3
PIF_VALUE: 28
PIF_VALUE: 2
PIF_VALUE: 15
PIF_VALUE: 26
PIF_VALUE: 25
PIF_VALUE: 28
PIF_VALUE: 26
PIF_VALUE: 26
PIF_VALUE: 23
PIF_VALUE: 15
PIF_VALUE: 12
PIF_VALUE: 26
PIF_VALUE: 12
PIF_VALUE: 26
PIF_VALUE: 26
PIF_VALUE: 23
PIF_VALUE: 26
PIF_VALUE: 26
PIF_VALUE: 23
PIF_VALUE: 16
PIF_VALUE: 26
PIF_VALUE: 23
PIF_VALUE: 1
PIF_VALUE: 26
PIF_VALUE: 16
PIF_VALUE: 1
PIF_VALUE: 26
PIF_VALUE: 0
PIF_VALUE: 26
PIF_VALUE: 12
PIF_VALUE: 26
PIF_VALUE: 28
PIF_VALUE: 26
PIF_VALUE: 10
PIF_VALUE: 12
PIF_VALUE: 3
PIF_VALUE: 26
PIF_VALUE: 26
PIF_VALUE: 15
PIF_VALUE: 28
PIF_VALUE: 0
PIF_VALUE: 23
PIF_VALUE: 1
PIF_VALUE: 0
PIF_VALUE: 25
PIF_VALUE: 4
PIF_VALUE: 26
PIF_VALUE: 12
PIF_VALUE: 26
PIF_VALUE: 26
PIF_VALUE: 22
PIF_VALUE: 3
PIF_VALUE: 3
PIF_VALUE: 12
PIF_VALUE: 15
PIF_VALUE: 15
PIF_VALUE: 25
PIF_VALUE: 24
PIF_VALUE: 26
PIF_VALUE: 23
PIF_VALUE: 28

## 2019-04-30 ASSESSMENT — PAIN DESCRIPTION - LOCATION: LOCATION: ABDOMEN

## 2019-04-30 ASSESSMENT — PAIN SCALES - GENERAL
PAINLEVEL_OUTOF10: 7
PAINLEVEL_OUTOF10: 5
PAINLEVEL_OUTOF10: 7
PAINLEVEL_OUTOF10: 6
PAINLEVEL_OUTOF10: 5

## 2019-04-30 ASSESSMENT — PAIN - FUNCTIONAL ASSESSMENT: PAIN_FUNCTIONAL_ASSESSMENT: 0-10

## 2019-04-30 ASSESSMENT — PAIN DESCRIPTION - PAIN TYPE: TYPE: SURGICAL PAIN

## 2019-04-30 ASSESSMENT — PAIN DESCRIPTION - DESCRIPTORS: DESCRIPTORS: DULL;SHARP

## 2019-04-30 NOTE — H&P
I have reviewed the history and physical and examined the patient. I find no relevant changes. I have reviewed with the patient and/or family members, during the preoperative office visit the risks, benefits, and alternatives to the procedure.     Joy Leal

## 2019-04-30 NOTE — OP NOTE
Patient: Javid Ordoñez  YOB: 1975  MRN: [id number]  Date of Procedure: 4/30/2019     Pre-Op Diagnosis: RIGHT INGUINAL HERNIA  Post-Op Diagnosis: Same       Procedure(s):ROBOTIC LAPAROSCOPIC RIGHT INGUINAL HERNIA REPAIR WITH MESH  Anesthesia: General     Surgeon(s):Brad Aviles MD  Assistant: Suze Ahmadi     Estimated Blood Loss (mL): less than 50   Complications: None  Findings: Direct hernia defect - right      Implants:  Implant Name Type Inv. Item Serial No.  Lot No. LRB No. Used   MESH SURG DUNIA GROIN 3DMAX LG RT [id number] Mesh MESH SURG DUNIA GROIN 3DMAX LG RT 4X6IN   [employer] [id number] Right 1           Indications for procedure: 42 yo male with symptomatic right inguinal hernia. Details of procedure: Patient was brought to the operating room after informed consent was obtained and placed on the operating room table in supine position. A state of general anesthesia was induced. Patient was prepped and draped in standard sterile fashion. Time out procedure was performed per institution protocol. 8 mm trocar was used to enter the abdomen via Optivew technique  just cephalad to the umbilicus. The abdomen was examined for any injury to internal organs from the trocar entry and none was found. Next, 2 more 8 mm robotic ports were placed on either side of and parallel to the first trocar,   This was followed by up sizing the supraumbilical port to a 12 mm robotic camera port. The hernia defect was visualized and the robot was docked. Creation of preperitoneal flap began by incising peritoneum just lateral to the medial umbilical fold and ~ half way between the pubis and the umbilicus. Combination of blunt and sharp dissection as well as electrocautery was used to facilitate the dissection. The flap was dissected out well past the inferior epigastric vessels and stopped just short of reaching the anterior superior iliac spine.   Inferiorly, the dissection

## 2019-04-30 NOTE — ANESTHESIA PRE PROCEDURE
Department of Anesthesiology  Preprocedure Note       Name:  Luna Junior   Age:  41 y.o.  :  1975                                          MRN:  [id number]         Date:  2019      Surgeon: Uriah Tenorio):  Lulu Duron MD    Procedure: ROBOTIC LAPAROSCOPIC RIGHT INGUINAL HERNIA REPAIR WITH MESH, POSSIBLE ROBOTIC LAPAROSCOPIC LEFT INGUINAL HERNIA REPAIR WITH MESH, POSSIBLE OPEN PROCEDURE (N/A Abdomen)    Medications prior to admission:   Prior to Admission medications    Medication Sig Start Date End Date Taking? Authorizing Provider   acetaminophen-codeine (TYLENOL #3) 300-30 MG per tablet Take 1 tablet by mouth every 4 hours as needed for Pain. Yes Historical Provider, MD   ondansetron (ZOFRAN ODT) 4 MG disintegrating tablet Take 1 tablet by mouth every 8 hours as needed for Nausea 19   LIBERTY Villasenor - CNP   ketorolac (TORADOL) 10 MG tablet Take 1 tablet by mouth 3 times daily 19   Cuco Trevizo MD       Current medications:    No current facility-administered medications for this encounter. Current Outpatient Medications   Medication Sig Dispense Refill    acetaminophen-codeine (TYLENOL #3) 300-30 MG per tablet Take 1 tablet by mouth every 4 hours as needed for Pain.       ondansetron (ZOFRAN ODT) 4 MG disintegrating tablet Take 1 tablet by mouth every 8 hours as needed for Nausea 20 tablet 0    ketorolac (TORADOL) 10 MG tablet Take 1 tablet by mouth 3 times daily 12 tablet 0       Allergies:  No Known Allergies    Problem List:    Patient Active Problem List   Diagnosis Code    Right inguinal hernia K40.90       Past Medical History:        Diagnosis Date    GERD (gastroesophageal reflux disease)     Kidney stone        Past Surgical History:        Procedure Laterality Date    DENTAL SURGERY      LITHOTRIPSY      NASAL SINUS SURGERY      SINUS SURGERY      TESTICLE SURGERY         Social History:    Social History     Tobacco Use    Smoking status: Former Smoker    Smokeless tobacco: Never Used   Substance Use Topics    Alcohol use: No                                Counseling given: Not Answered      Vital Signs (Current):   Vitals:    04/25/19 1226   Weight: 184 lb (83.5 kg)   Height: 6' (1.829 m)                                              BP Readings from Last 3 Encounters:   04/18/19 105/70   04/16/19 125/73   04/12/19 115/78       NPO Status:                                                                                 BMI:   Wt Readings from Last 3 Encounters:   04/18/19 187 lb 3.2 oz (84.9 kg)   04/16/19 184 lb (83.5 kg)   04/12/19 184 lb (83.5 kg)     Body mass index is 24.95 kg/m². CBC:   Lab Results   Component Value Date    WBC 4.6 04/16/2019    RBC 5.19 04/16/2019    HGB 16.2 04/16/2019    HCT 46.8 04/16/2019    MCV 90.2 04/16/2019    RDW 13.4 04/16/2019     04/16/2019       CMP:   Lab Results   Component Value Date     04/16/2019    K 3.9 04/16/2019     04/16/2019    CO2 26 04/16/2019    BUN 15 04/16/2019    CREATININE 1.0 04/16/2019    GFRAA >60 04/16/2019    AGRATIO 1.7 04/16/2019    LABGLOM >60 04/16/2019    GLUCOSE 85 04/16/2019    PROT 7.4 04/16/2019    CALCIUM 9.3 04/16/2019    BILITOT 0.7 04/16/2019    ALKPHOS 101 04/16/2019    AST 19 04/16/2019    ALT 23 04/16/2019       POC Tests: No results for input(s): POCGLU, POCNA, POCK, POCCL, POCBUN, POCHEMO, POCHCT in the last 72 hours.     Coags:   Lab Results   Component Value Date    PROTIME 12.4 05/09/2018    INR 1.10 05/09/2018       HCG (If Applicable): No results found for: PREGTESTUR, PREGSERUM, HCG, HCGQUANT     ABGs: No results found for: PHART, PO2ART, BBV5PFZ, EYV8CDA, BEART, G4FFOJUN     Type & Screen (If Applicable):  No results found for: LABABO, [address]    Anesthesia Evaluation  Patient summary reviewed no history of anesthetic complications:   Airway: Mallampati: II  TM distance: >3 FB   Neck ROM: full  Mouth opening: > = 3 FB Dental: normal exam Pulmonary:Negative Pulmonary ROS                              Cardiovascular:Negative CV ROS                      Neuro/Psych:   Negative Neuro/Psych ROS              GI/Hepatic/Renal:   (+) GERD: well controlled,      (-) liver disease and no renal disease       Endo/Other: Negative Endo/Other ROS       (-) diabetes mellitus               Abdominal:           Vascular: negative vascular ROS. Anesthesia Plan      general     ASA 2       Induction: intravenous. MIPS: Prophylactic antiemetics administered. Anesthetic plan and risks discussed with patient. Plan discussed with CRNA. All questions answered and agrees with plan.         Angelica Betancur MD   4/30/2019

## 2019-05-13 ENCOUNTER — OFFICE VISIT (OUTPATIENT)
Dept: SURGERY | Age: 44
End: 2019-05-13

## 2019-05-13 VITALS
SYSTOLIC BLOOD PRESSURE: 120 MMHG | HEIGHT: 72 IN | WEIGHT: 176 LBS | BODY MASS INDEX: 23.84 KG/M2 | DIASTOLIC BLOOD PRESSURE: 64 MMHG | HEART RATE: 61 BPM

## 2019-05-13 DIAGNOSIS — Z98.890 S/P RIGHT INGUINAL HERNIA REPAIR: Primary | ICD-10-CM

## 2019-05-13 DIAGNOSIS — Z87.19 S/P RIGHT INGUINAL HERNIA REPAIR: Primary | ICD-10-CM

## 2019-05-13 PROCEDURE — 99024 POSTOP FOLLOW-UP VISIT: CPT | Performed by: SURGERY

## 2021-02-16 ENCOUNTER — APPOINTMENT (OUTPATIENT)
Dept: CT IMAGING | Age: 46
End: 2021-02-16
Payer: COMMERCIAL

## 2021-02-16 ENCOUNTER — HOSPITAL ENCOUNTER (EMERGENCY)
Age: 46
Discharge: HOME | End: 2021-02-16
Attending: EMERGENCY MEDICINE
Payer: COMMERCIAL

## 2021-02-16 VITALS
SYSTOLIC BLOOD PRESSURE: 134 MMHG | DIASTOLIC BLOOD PRESSURE: 96 MMHG | HEIGHT: 72 IN | WEIGHT: 180 LBS | BODY MASS INDEX: 24.38 KG/M2 | OXYGEN SATURATION: 99 % | TEMPERATURE: 98.2 F | HEART RATE: 72 BPM | RESPIRATION RATE: 19 BRPM

## 2021-02-16 DIAGNOSIS — M79.18 MUSCULOSKELETAL PAIN: Primary | ICD-10-CM

## 2021-02-16 LAB
A/G RATIO: 1.9 (ref 1.1–2.2)
ALBUMIN SERPL-MCNC: 5 G/DL (ref 3.4–5)
ALP BLD-CCNC: 92 U/L (ref 40–129)
ALT SERPL-CCNC: 17 U/L (ref 10–40)
ANION GAP SERPL CALCULATED.3IONS-SCNC: 8 MMOL/L (ref 3–16)
AST SERPL-CCNC: 15 U/L (ref 15–37)
BASOPHILS ABSOLUTE: 0 K/UL (ref 0–0.2)
BASOPHILS RELATIVE PERCENT: 0.6 %
BILIRUB SERPL-MCNC: 0.7 MG/DL (ref 0–1)
BILIRUBIN URINE: NEGATIVE
BLOOD, URINE: NEGATIVE
BUN BLDV-MCNC: 17 MG/DL (ref 7–20)
CALCIUM SERPL-MCNC: 10.1 MG/DL (ref 8.3–10.6)
CHLORIDE BLD-SCNC: 99 MMOL/L (ref 99–110)
CLARITY: CLEAR
CO2: 31 MMOL/L (ref 21–32)
COLOR: YELLOW
CREAT SERPL-MCNC: 0.8 MG/DL (ref 0.9–1.3)
EOSINOPHILS ABSOLUTE: 0.1 K/UL (ref 0–0.6)
EOSINOPHILS RELATIVE PERCENT: 1.6 %
GFR AFRICAN AMERICAN: >60
GFR NON-AFRICAN AMERICAN: >60
GLOBULIN: 2.7 G/DL
GLUCOSE BLD-MCNC: 97 MG/DL (ref 70–99)
GLUCOSE URINE: NEGATIVE MG/DL
HCT VFR BLD CALC: 45.1 % (ref 40.5–52.5)
HEMOGLOBIN: 15.4 G/DL (ref 13.5–17.5)
KETONES, URINE: NEGATIVE MG/DL
LEUKOCYTE ESTERASE, URINE: NEGATIVE
LIPASE: 41 U/L (ref 13–60)
LYMPHOCYTES ABSOLUTE: 1.8 K/UL (ref 1–5.1)
LYMPHOCYTES RELATIVE PERCENT: 25.7 %
MCH RBC QN AUTO: 30.7 PG (ref 26–34)
MCHC RBC AUTO-ENTMCNC: 34.1 G/DL (ref 31–36)
MCV RBC AUTO: 90.3 FL (ref 80–100)
MICROSCOPIC EXAMINATION: NORMAL
MONOCYTES ABSOLUTE: 0.4 K/UL (ref 0–1.3)
MONOCYTES RELATIVE PERCENT: 6 %
NEUTROPHILS ABSOLUTE: 4.5 K/UL (ref 1.7–7.7)
NEUTROPHILS RELATIVE PERCENT: 66.1 %
NITRITE, URINE: NEGATIVE
PDW BLD-RTO: 13.1 % (ref 12.4–15.4)
PH UA: 6.5 (ref 5–8)
PLATELET # BLD: 165 K/UL (ref 135–450)
PMV BLD AUTO: 9.6 FL (ref 5–10.5)
POTASSIUM SERPL-SCNC: 4.1 MMOL/L (ref 3.5–5.1)
PROTEIN UA: NEGATIVE MG/DL
RBC # BLD: 4.99 M/UL (ref 4.2–5.9)
SODIUM BLD-SCNC: 138 MMOL/L (ref 136–145)
SPECIFIC GRAVITY UA: 1.01 (ref 1–1.03)
TOTAL PROTEIN: 7.7 G/DL (ref 6.4–8.2)
URINE TYPE: NORMAL
UROBILINOGEN, URINE: 0.2 E.U./DL
WBC # BLD: 6.8 K/UL (ref 4–11)

## 2021-02-16 PROCEDURE — 83690 ASSAY OF LIPASE: CPT

## 2021-02-16 PROCEDURE — 81003 URINALYSIS AUTO W/O SCOPE: CPT

## 2021-02-16 PROCEDURE — 6360000004 HC RX CONTRAST MEDICATION: Performed by: NURSE PRACTITIONER

## 2021-02-16 PROCEDURE — 99283 EMERGENCY DEPT VISIT LOW MDM: CPT

## 2021-02-16 PROCEDURE — 6370000000 HC RX 637 (ALT 250 FOR IP): Performed by: NURSE PRACTITIONER

## 2021-02-16 PROCEDURE — 85025 COMPLETE CBC W/AUTO DIFF WBC: CPT

## 2021-02-16 PROCEDURE — 74177 CT ABD & PELVIS W/CONTRAST: CPT

## 2021-02-16 PROCEDURE — 80053 COMPREHEN METABOLIC PANEL: CPT

## 2021-02-16 RX ORDER — IBUPROFEN 600 MG/1
600 TABLET ORAL 3 TIMES DAILY PRN
Qty: 9 TABLET | Refills: 0 | Status: SHIPPED | OUTPATIENT
Start: 2021-02-16 | End: 2021-02-19

## 2021-02-16 RX ORDER — IBUPROFEN 800 MG/1
800 TABLET ORAL ONCE
Status: COMPLETED | OUTPATIENT
Start: 2021-02-16 | End: 2021-02-16

## 2021-02-16 RX ADMIN — IBUPROFEN 800 MG: 800 TABLET, FILM COATED ORAL at 09:00

## 2021-02-16 RX ADMIN — IOPAMIDOL 75 ML: 755 INJECTION, SOLUTION INTRAVENOUS at 09:17

## 2021-02-16 ASSESSMENT — PAIN DESCRIPTION - ORIENTATION: ORIENTATION: LEFT

## 2021-02-16 ASSESSMENT — PAIN SCALES - GENERAL: PAINLEVEL_OUTOF10: 6

## 2021-02-16 NOTE — ED PROVIDER NOTES
I independently performed a history and physical on Patrick Lenz. All diagnostic, treatment, and disposition decisions were made by myself in conjunction with the advanced practice provider. I have participated in the medical decision making and directed the treatment plan and disposition of the patient. For further details of [facility] emergency department encounter, please see the advanced practice provider's documentation. CHIEF COMPLAINT  Chief Complaint   Patient presents with    Abdominal Pain     patient reporting left side flank pain raditing into back x 1 month. Briefly, Patrick Lenz is a 39 y.o. male  who presents to the ED complaining of left flank pain intermittently for 1 month. Nonradiating. No dysuria or hematuria. No emesis. History of kidney stone    FOCUSED PHYSICAL EXAMINATION  BP (!) 134/96   Pulse 72   Temp 98.2 °F (36.8 °C) (Oral)   Resp 19   Ht 5' 11.5\" (1.816 m)   Wt 180 lb (81.6 kg)   SpO2 99%   BMI 24.76 kg/m²      Focused physical examination:  General appearance:  Cooperative. No acute distress. Skin:  Warm. Dry. Eye:  Extraocular movements intact. Ears, nose, mouth and throat:  Oral mucosa moist,  Neck:  Trachea midline. Heart:  Regular rate and rhythm  Perfusion:  intact  Respiratory:  Lungs clear to auscultation bilaterally. Respirations nonlabored. Abdominal:   Non distended. Nontender. Mild left lateral flank tenderness not overt CVA tenderness. Neurological:  Alert and oriented x 3. Moves all extremities spontaneously  Musculoskeletal:   Normal ROM, no deformities          Psychiatric:  Normal mood          MDM: Patient presents today with left flank pain. Not true CVA tenderness more lateral than that. Overall abdomen soft. Intermittent for 1 month. Labs are unremarkable but CT scan was performed to rule out any evidence of stone or other occult underlying pathology.   This was ultimately normal.  Suspect a likely musculoskeletal etiology but otherwise feel he may be discharged home    During the patient's ED course, the patient was given:  Medications   ibuprofen (ADVIL;MOTRIN) tablet 800 mg (800 mg Oral Given 2/16/21 0900)   iopamidol (ISOVUE-370) 76 % injection 75 mL (75 mLs Intravenous Given 2/16/21 0917)        CLINICAL IMPRESSION  1. Musculoskeletal pain        DISPOSITION  Home      This chart was created using Dragon dictation software. Efforts were made by me to ensure accuracy, however some errors may be present due to limitations of this technology.             Aaron Hills MD  02/16/21 [id number]

## 2021-02-16 NOTE — ED PROVIDER NOTES
[facility] Emergency Department    CHIEF COMPLAINT  Abdominal Pain      SHARED SERVICE VISIT  I have seen and evaluated this patient with my supervising physician, Dr. Vamsi Hill. HISTORY OF PRESENT ILLNESS  Tor Pinto is a 39 y.o. male with a history of kidney stones and pancreatitis who presents to the ED complaining of a 1 month history of intermittent left flank \"sharp, aching, dull\" 6/10 which became constant on yesterday. Accompanying symptom includes nausea-resolved. Patient denies vomiting, fever, chills, sweats, urinary symptoms, urinary retention, constipation, or other complaints. He does endorse drinking 3 shots of liquor and 2 beers on Monday night. Pain is alleviated by standing and exacerbated by lying and sitting. He has tried ibuprofen with improvement of discomfort. No other complaints, modifying factors or associated symptoms. Nursing notes reviewed.    Past Medical History:   Diagnosis Date    GERD (gastroesophageal reflux disease)     Kidney stone      Past Surgical History:   Procedure Laterality Date    DENTAL SURGERY      HERNIA REPAIR Right 4/30/2019    ROBOTIC LAPAROSCOPIC RIGHT INGUINAL HERNIA REPAIR WITH MESH, POSSIBLE ROBOTIC LAPAROSCOPIC LEFT INGUINAL HERNIA REPAIR WITH MESH, POSSIBLE OPEN PROCEDURE performed by Vale Garcia MD at [address] [facility] [place]      SINUS SURGERY      TESTICLE SURGERY       Family History   Problem Relation Age of Onset    No Known Problems Mother     No Known Problems Father      Social History     Socioeconomic History    Marital status:      Spouse name: Not on file    Number of children: Not on file    Years of education: Not on file    Highest education level: Not on file   Occupational History    Not on file   Social Needs    Financial resource strain: Not on file    Food insecurity     Worry: Not on file     Inability: Not on file   Zee Learn noted to be negative    PHYSICAL EXAM  Pulse 67   SpO2 97%   GENERAL APPEARANCE: Awake and alert. Cooperative.  + Mild distress. Non-- toxic in appearance. HEAD: Normocephalic. Atraumatic. EYES: PERRL. EOM's grossly intact. ENT: Mucous membranes are moist.   NECK: Supple. HEART: RRR. No murmurs. LUNGS: Respirations unlabored. CTAB. Good air exchange. Speaking comfortably in full sentences. ABDOMEN: Soft. Non-distended.  + Mild left-sided tenderness. No guarding or rebound. No masses. No organomegaly. + Bowel sounds x4 quadrants. Negative Rovsing, psoas, obturator, Crabtree's signs. There is no McBurney's point tenderness. Back: + Mild left CVAT. EXTREMITIES: No peripheral edema. Moves all extremities equally. All extremities neurovascularly intact. SKIN: Warm and dry. No acute rashes. NEUROLOGICAL: Alert and oriented. CN's 2-12 intact. No gross facial drooping. Strength 5/5, sensation intact. PSYCHIATRIC: Normal mood and affect. RADIOLOGY  No results found. ED COURSE  Patient received ibuprofen for pain, with good relief. Triage vitals stable but hypertensive 134/96 mmHg. An abdominal/renal workup was initiated including urinalysis reflex to culture, CBC, CMP, lipase, CT abdomen and pelvis. Interventions: Patient was given Tylenol for mild discomfort.         Labs Ordered:  I have reviewed and interpreted all of the currently available lab results from this visit:  Results for orders placed or performed during the hospital encounter of 02/16/21   CBC auto differential   Result Value Ref Range    WBC 6.8 4.0 - 11.0 K/uL    RBC 4.99 4.20 - 5.90 M/uL    Hemoglobin 15.4 13.5 - 17.5 g/dL    Hematocrit 45.1 40.5 - 52.5 %    MCV 90.3 80.0 - 100.0 fL    MCH 30.7 26.0 - 34.0 pg    MCHC 34.1 31.0 - 36.0 g/dL    RDW 13.1 12.4 - 15.4 %    Platelets 636 708 - 605 K/uL    MPV 9.6 5.0 - 10.5 fL    Neutrophils % 66.1 %    Lymphocytes % 25.7 %    Monocytes % 6.0 %    Eosinophils % 1.6 %    Basophils % 0.6 %    Neutrophils Absolute 4.5 1.7 - 7.7 K/uL    Lymphocytes Absolute 1.8 1.0 - 5.1 K/uL    Monocytes Absolute 0.4 0.0 - 1.3 K/uL    Eosinophils Absolute 0.1 0.0 - 0.6 K/uL    Basophils Absolute 0.0 0.0 - 0.2 K/uL   Comprehensive metabolic panel   Result Value Ref Range    Sodium 138 136 - 145 mmol/L    Potassium 4.1 3.5 - 5.1 mmol/L    Chloride 99 99 - 110 mmol/L    CO2 31 21 - 32 mmol/L    Anion Gap 8 3 - 16    Glucose 97 70 - 99 mg/dL    BUN 17 7 - 20 mg/dL    CREATININE 0.8 (L) 0.9 - 1.3 mg/dL    GFR Non-African American >60 >60    GFR African American >60 >60    Calcium 10.1 8.3 - 10.6 mg/dL    Total Protein 7.7 6.4 - 8.2 g/dL    Albumin 5.0 3.4 - 5.0 g/dL    Albumin/Globulin Ratio 1.9 1.1 - 2.2    Total Bilirubin 0.7 0.0 - 1.0 mg/dL    Alkaline Phosphatase 92 40 - 129 U/L    ALT 17 10 - 40 U/L    AST 15 15 - 37 U/L    Globulin 2.7 g/dL   Lipase   Result Value Ref Range    Lipase 41.0 13.0 - 60.0 U/L   Urinalysis, reflex to microscopic   Result Value Ref Range    Color, UA Yellow Straw/Yellow    Clarity, UA Clear Clear    Glucose, Ur Negative Negative mg/dL    Bilirubin Urine Negative Negative    Ketones, Urine Negative Negative mg/dL    Specific Gravity, UA 1.010 1.005 - 1.030    Blood, Urine Negative Negative    pH, UA 6.5 5.0 - 8.0    Protein, UA Negative Negative mg/dL    Urobilinogen, Urine 0.2 <2.0 E.U./dL    Nitrite, Urine Negative Negative    Leukocyte Esterase, Urine Negative Negative    Microscopic Examination Not Indicated     Urine Type NotGiven            Imaging ordered:  Ct Abdomen Pelvis W Iv Contrast Additional Contrast? None    Result Date: 2/16/2021  No acute intra-abdominal abnormality           Reevaluation:  Patient resting comfortably on stretcher with eyes open with stable vital signs. He endorses improvement of his symptoms. A discussion was had with Mr. Lorenza Angeles regarding musculoskeletal pain.   Risk management discussed and shared decision making had with patient and/or surrogate. All questions were answered. Patient will follow up with his PCP for further evaluation/treatment. All questions answered. Patient will return to ED for new/worsening symptoms. Patient is agreeable with this plan. Patient was sent home with a prescription for ibuprofen. CRITICAL CARE TIME  0 Minutes of critical care time spent not including separately billable procedures. MDM  Patient presents to the emergency department with left flank and left-sided abdominal pain. Alternate diagnoses are less likely based on history and physical. Considered kidney stone, pyelonephritis, UTI, appendicitis, bowel obstruction, diverticulitis, hernia, gastritis/gastroenteritis, pancreatitis, cholecystitis, hepatitis, constipation, IBS, IBD, among others but less likely based on history and physical.    My attending physician and I feel that the patient is safe and appropriate for discharge to home with outpatient follow-up with PCP in the next 1-2 days. This is likely musculoskeletal pain. Patient will be treated with ibuprofen 600 mg tablet: Take 1 tablet by mouth 3 times daily as needed for pain. He is instructed to take this medication with food as this is an NSAID class of medication which can otherwise cause stomach ulcers if taken on an empty stomach. He is instructed to return to the emergency department immediately for new or worsening symptoms including but not limited to, increased pain not relieved by pain medication, chest pain, inability to urinate, intractable nausea and vomiting, shortness of breath, passing out, feeling as if he cannot pass out, or other concerns. .  Patient verbalizes understanding and is agreeable with plan for discharge and follow-up. Clinical Impression:  Musculoskeletal pain      DISPOSITION  Discharged      This chart was created using Dragon dictation.  Every effort was made by myself to ensure accuracy, however due to limitations of this technology errors may be present.      Jennifer Albert, LIBERTY - CNP  02/19/21 0772

## (undated) DEVICE — BLADELESS OBTURATOR: Brand: WECK VISTA

## (undated) DEVICE — COLUMN DRAPE

## (undated) DEVICE — SUTURE VCRL + SZ 0 L27IN ABSRB VLT L26MM UR-6 5/8 CIR VCP603H

## (undated) DEVICE — Z DISCONTINUED GLOVE SURG SZ 7.5 L12IN FNGR THK13MIL WHT ISOLEX

## (undated) DEVICE — SUTURE VCRL + SZ 3-0 L18IN ABSRB UD SH 1/2 CIR TAPERCUT NDL VCP864D

## (undated) DEVICE — TUBING FLTR PLUME AWAY EVAC W/ SUCT DEV DISP PUREVIEW

## (undated) DEVICE — SEAL

## (undated) DEVICE — Z DISCONTINUED GLOVE SURG SZ 7 L12IN FNGR THK13MIL WHT ISOLEX POLYISOPRENE

## (undated) DEVICE — CANNULA SEAL

## (undated) DEVICE — Z DISCONTINUED USE 2275686 GLOVE SURG SZ 8 L12IN FNGR THK13MIL WHT ISOLEX POLYISOPRENE

## (undated) DEVICE — SOLUTION IV IRRIG WATER 1000ML POUR BRL 2F7114

## (undated) DEVICE — REDUCER: Brand: ENDOWRIST

## (undated) DEVICE — MEGA SUTURECUT ND: Brand: ENDOWRIST

## (undated) DEVICE — TIP COVER ACCESSORY

## (undated) DEVICE — SCISSORS SURG DIA8MM MPLR CRV ENDOWRIST

## (undated) DEVICE — GLOVE SURG SZ 85 L12IN FNGR THK13MIL WHT ISOLEX POLYISOPRENE

## (undated) DEVICE — Device

## (undated) DEVICE — GOWN SIRUS NONREIN XL W/TWL: Brand: MEDLINE INDUSTRIES, INC.

## (undated) DEVICE — SUTURE MCRYL + SZ 4-0 L18IN ABSRB UD L19MM PS-2 3/8 CIR MCP496G

## (undated) DEVICE — GOWN SIRUS NONREIN LG W/TWL: Brand: MEDLINE INDUSTRIES, INC.

## (undated) DEVICE — Z DISCONTINUED NO SUB IDED GLOVE SURG SZ 6 L12IN FNGR THK13MIL WHT ISOLEX POLYISOPRENE

## (undated) DEVICE — ARM DRAPE

## (undated) DEVICE — SUTURE VCRL + SZ 2-0 L27IN ABSRB VLT SH 1/2 CIR TAPERPOINT VCP317H

## (undated) DEVICE — SUTURE STRATAFIX SPRL MCRYL + SZ 3 0 L8IN ABSRB UD L26MM SH SXMP1B427

## (undated) DEVICE — FENESTRATED BIPOLAR FORCEPS: Brand: ENDOWRIST